# Patient Record
Sex: MALE | Race: WHITE | NOT HISPANIC OR LATINO | Employment: FULL TIME | URBAN - METROPOLITAN AREA
[De-identification: names, ages, dates, MRNs, and addresses within clinical notes are randomized per-mention and may not be internally consistent; named-entity substitution may affect disease eponyms.]

---

## 2024-05-17 ENCOUNTER — OFFICE VISIT (OUTPATIENT)
Dept: FAMILY MEDICINE | Facility: CLINIC | Age: 29
End: 2024-05-17

## 2024-05-17 VITALS
DIASTOLIC BLOOD PRESSURE: 78 MMHG | TEMPERATURE: 98 F | SYSTOLIC BLOOD PRESSURE: 110 MMHG | RESPIRATION RATE: 20 BRPM | BODY MASS INDEX: 21.76 KG/M2 | WEIGHT: 152 LBS | HEART RATE: 94 BPM | HEIGHT: 70 IN | OXYGEN SATURATION: 97 %

## 2024-05-17 DIAGNOSIS — Z71.89 ACP (ADVANCE CARE PLANNING): Primary | ICD-10-CM

## 2024-05-17 DIAGNOSIS — Z13.220 LIPID SCREENING: ICD-10-CM

## 2024-05-17 DIAGNOSIS — N34.2 URETHRITIS: ICD-10-CM

## 2024-05-17 DIAGNOSIS — Z76.89 HEALTH CARE HOME: ICD-10-CM

## 2024-05-17 DIAGNOSIS — Z13.1 DIABETES MELLITUS SCREENING: ICD-10-CM

## 2024-05-17 DIAGNOSIS — Z00.00 ROUTINE PHYSICAL EXAMINATION: ICD-10-CM

## 2024-05-17 PROCEDURE — 99212 OFFICE O/P EST SF 10 MIN: CPT | Mod: 25 | Performed by: STUDENT IN AN ORGANIZED HEALTH CARE EDUCATION/TRAINING PROGRAM

## 2024-05-17 PROCEDURE — 99385 PREV VISIT NEW AGE 18-39: CPT | Performed by: STUDENT IN AN ORGANIZED HEALTH CARE EDUCATION/TRAINING PROGRAM

## 2024-05-17 RX ORDER — DOXYCYCLINE HYCLATE 100 MG
100 TABLET ORAL 2 TIMES DAILY
Qty: 20 TABLET | Refills: 0 | Status: SHIPPED | OUTPATIENT
Start: 2024-05-17 | End: 2024-05-27

## 2024-05-17 RX ORDER — CETIRIZINE HYDROCHLORIDE 10 MG/1
10 TABLET ORAL DAILY
COMMUNITY

## 2024-05-17 NOTE — NURSING NOTE
Chief Complaint   Patient presents with    New Patient     New patient to this clinic     Physical     Annual, non fasting     Consult For     Jammed hand golfing, happened a month ago and still is having some pain, did have  look at it and was told there was nothing broken, does get shoulder pain      Pre-visit Screening:  Immunizations:  unknown-need to get records-moved here from [place]-did find records in care everywhere   Colonoscopy:  na  Mammogram: na  Asthma Action Test/Plan:  na  PHQ9:  PHQ2 done today   GAD7:  na  Questioned patient about current smoking habits Pt. has never smoked.  Ok to leave detailed message on voice mail for today's visit only yes, phone # [phone] (home) [phone] (work)

## 2024-05-17 NOTE — PATIENT INSTRUCTIONS
Start antibiotic twice daily, continue to drink plenty of fluids - need to repeat labs if not resolved    Call for fasting lab appointment anytime     Can consider Cardiology consult anytime given family history

## 2024-05-17 NOTE — PROGRESS NOTES
SUBJECTIVE:   CC: Justin Gale is an 28 year old male who presents for preventive health visit.     Patient has been advised of split billing requirements and indicates understanding: Yes    Nursing Notes:   Carissa Munoz CMA  5/17/2024 10:13 AM  Signed  Chief Complaint   Patient presents with    New Patient     New patient to this clinic     Physical     Annual, non fasting     Consult For     Jammed hand golfing, happened a month ago and still is having some pain, did have  look at it and was told there was nothing broken, does get shoulder pain      Pre-visit Screening:  Immunizations:  unknown-need to get records-moved here from [place]-did find records in care everywhere   Colonoscopy:  na  Mammogram: na  Asthma Action Test/Plan:  na  PHQ9:  PHQ2 done today   GAD7:  na  Questioned patient about current smoking habits Pt. has never smoked.  Ok to leave detailed message on voice mail for today's visit only yes, phone # [phone] (home) [phone] (work)         Healthy Habits:  General health: good, has made lifestyle changes since last cpe 2023 - eating better and exercising more regularly  Sleep: no concerns   Mental Health: no concerns      Problems taking medications regularly not applicable  Have you had an eye exam in the past two years? No concerns   Do you see a dentist twice per year? yes  Do you have sleep apnea, excessive snoring or daytime drowsiness?no      Today's PHQ-2 Score:       5/17/2024    10:05 AM   PHQ-2 ( 1999 [employer])   Q1: Little interest or pleasure in doing things 0   Q2: Feeling down, depressed or hopeless 0   PHQ-2 Score 0       Do you feel safe in your environment? Yes        Social History     Tobacco Use    Smoking status: Never    Smokeless tobacco: Never   Substance Use Topics    Alcohol use: Yes     Alcohol/week: 2.0 standard drinks of alcohol     Types: 2 Standard drinks or equivalent per week     Comment: 2 times a week     If you drink alcohol do  "you typically have >3 drinks per day or >7 drinks per week? No                      Last PSA: No results found for: \"PSA\"    Reviewed orders with patient. Reviewed health maintenance and updated orders accordingly - Yes  Lab work is in process  Labs reviewed in EPIC  BP Readings from Last 3 Encounters:   05/17/24 110/78    Wt Readings from Last 3 Encounters:   05/17/24 68.9 kg (152 lb)                    Reviewed and updated as needed this visit by clinical staff   Tobacco  Allergies  Meds              Reviewed and updated as needed this visit by Provider                  Past Medical History:   Diagnosis Date    No pertinent past medical history       Past Surgical History:   Procedure Laterality Date    BILATERAL HIP ARTHROSCOPY      FINGER SURGERY      L pinky pinned     Family History   Problem Relation Age of Onset    Hyperlipidemia Mother     Asthma Mother     Breast Cancer Mother     Osteoarthritis Father     Hyperlipidemia Father     Ulcerative Colitis Sister     Heart Failure Maternal Grandmother     Heart Disease Maternal Grandmother     Coronary Artery Disease Maternal Grandfather     Heart Disease Maternal Grandfather     Osteoarthritis Paternal Grandmother     Cerebrovascular Disease Paternal Grandmother     Cancer Paternal Grandfather         lung cancer, 87    Skin Cancer Paternal Grandfather     Diabetes No family hx of        ROS:  12 point ROS performed and negative for new concerns except as mentioned above     OBJECTIVE:   /78 (BP Location: Right arm, Patient Position: Sitting, Cuff Size: Adult Large)   Pulse 94   Temp 98  F (36.7  C) (Temporal)   Resp 20   Ht 1.765 m (5' 9.5\")   Wt 68.9 kg (152 lb)   SpO2 97%   BMI 22.12 kg/m    EXAM:  GENERAL: alert and no distress  EYES: Eyes grossly normal to inspection, PERRL and conjunctivae and sclerae normal  HENT: ear canals and TM's normal, nose and mouth without ulcers or lesions  NECK: no adenopathy, no asymmetry, masses, or " "scars  RESP: lungs clear to auscultation - no rales, rhonchi or wheezes  CV: regular rate and rhythm, normal S1 S2, no S3 or S4, no murmur, click or rub, no peripheral edema  ABDOMEN: soft, nontender, no hepatosplenomegaly, no masses and bowel sounds normal  MS: no gross musculoskeletal defects noted, no edema  SKIN: no suspicious lesions or rashes  NEURO: Normal strength and tone, mentation intact and speech normal  PSYCH: mentation appears normal, affect normal/bright    Diagnostic Test Results:  Labs reviewed in Epic    ASSESSMENT/PLAN:       ICD-10-CM    1. ACP (advance care planning)  Z71.89       2. Health Care Home  Z76.89       3. Routine physical examination  Z00.00       4. Lipid screening  Z13.220 Lipid Panel (BFP)      5. Diabetes mellitus screening  Z13.1 Comprehensive Metobolic Panel (BFP)      6. Urethritis  N34.2 doxycycline hyclate (VIBRA-TABS) 100 MG tablet           Patient has been advised of split billing requirements and indicates understanding: Yes  Other issues addressed:   L hand injury - ttp prox 2nd MT only without deformity or mechanism to suggest fracture, likely sprain. Recommend 4 weeks of rest, consider OTC brace. If not improving would favor MRI to eval for ligamentous injury.  Dysuria and urethral discharge - reports full negative panel STD and urinalysis at urgent care, records not available. Sx improved but didn't resolve after 7d abx (macrobid?). Will tx with course of doxy, needs retesting if sx don't resolve completely.     COUNSELING:  Reviewed preventive health counseling, as reflected in patient instructions       Regular exercise       Healthy diet/nutrition       Vision screening       Immunizations       Alcohol Use        Safe sex practices/STD prevention    Estimated body mass index is 22.12 kg/m  as calculated from the following:    Height as of this encounter: 1.765 m (5' 9.5\").    Weight as of this encounter: 68.9 kg (152 lb).        He reports that he has never " smoked. He has never used smokeless tobacco.    Patient Instructions   Start antibiotic twice daily, continue to drink plenty of fluids - need to repeat labs if not resolved    Call for fasting lab appointment anytime     Can consider Cardiology consult anytime given family history     Leif Perry MD, [facility]

## 2024-05-17 NOTE — PROGRESS NOTES
SUBJECTIVE:   CC: Justin Gale is an 28 year old male who presents for preventive health visit.     Patient has been advised of split billing requirements and indicates understanding: Yes    Nursing Notes:   Carissa Munoz CMA  5/17/2024 10:13 AM  Signed  Chief Complaint   Patient presents with    New Patient     New patient to this clinic     Physical     Annual, non fasting     Consult For     Jammed hand golfing, happened a month ago and still is having some pain, did have  look at it and was told there was nothing broken, does get shoulder pain      Pre-visit Screening:  Immunizations:  unknown-need to get records-moved here from [place]-did find records in care everywhere   Colonoscopy:  na  Mammogram: na  Asthma Action Test/Plan:  na  PHQ9:  PHQ2 done today   GAD7:  na  Questioned patient about current smoking habits Pt. has never smoked.  Ok to leave detailed message on voice mail for today's visit only yes, phone # [phone] (home) [phone] (work)         Healthy Habits:  General health: ***  Diet: ***  Exercise: ***  Sleep: ***  Mental Health: ***     Problems taking medications regularly { :754377}  Medication side effects: { :611165}  Have you had an eye exam in the past two years? { :505827}  Do you see a dentist twice per year? { :809581}  Do you have sleep apnea, excessive snoring or daytime drowsiness?{ :195027}    {additional problems to add (Optional):856346}    Today's PHQ-2 Score:       5/17/2024    10:05 AM   PHQ-2 ( 1999 [employer])   Q1: Little interest or pleasure in doing things 0   Q2: Feeling down, depressed or hopeless 0   PHQ-2 Score 0       Do you feel safe in your environment? {YES/NO/NA:710173}        Social History     Tobacco Use    Smoking status: Never    Smokeless tobacco: Never   Substance Use Topics    Alcohol use: Yes     Alcohol/week: 2.0 standard drinks of alcohol     Types: 2 Standard drinks or equivalent per week     Comment: 2 times a week  "    If you drink alcohol do you typically have >3 drinks per day or >7 drinks per week? {ETOH :081129}                      Last PSA: No results found for: \"PSA\"    Reviewed orders with patient. Reviewed health maintenance and updated orders accordingly - Yes  {Chronicprobdata (Optional):320255}    Reviewed and updated as needed this visit by clinical staff   Tobacco  Allergies  Meds              Reviewed and updated as needed this visit by Provider                  No past medical history on file.   No past surgical history on file.  No family history on file.    ROS:  12 point ROS performed and negative for new concerns except as mentioned above     OBJECTIVE:   /78 (BP Location: Right arm, Patient Position: Sitting, Cuff Size: Adult Large)   Pulse 94   Temp 98  F (36.7  C) (Temporal)   Resp 20   Ht 1.765 m (5' 9.5\")   Wt 68.9 kg (152 lb)   SpO2 97%   BMI 22.12 kg/m    EXAM:  {Exam Choices:573698}    {Diagnostic Test Results (Optional):833054}    ASSESSMENT/PLAN:       ICD-10-CM    1. ACP (advance care planning)  Z71.89       2. Health Care Home  Z76.89            Patient has been advised of split billing requirements and indicates understanding: Yes  COUNSELING:  {MALE COUNSELING MESSAGES:876455::\"Reviewed preventive health counseling, as reflected in patient instructions\"}    Estimated body mass index is 22.12 kg/m  as calculated from the following:    Height as of this encounter: 1.765 m (5' 9.5\").    Weight as of this encounter: 68.9 kg (152 lb).    {Weight Management Plan (ACO) Complete if BMI is abnormal-  Ages 18-64  BMI >24.9.  Age 65+ with BMI <23 or >30 (Optional):947058}    He reports that he has never smoked. He has never used smokeless tobacco.      Counseling Resources:  ATP IV Guidelines  Pooled Cohorts Equation Calculator  FRAX Risk Assessment  ICSI Preventive Guidelines  Dietary Guidelines for Americans, 2010  USDA's MyPlate  ASA Prophylaxis  Lung CA Screening    There are no " Patient Instructions on file for this visit.     Leif Perry MD, CAQSM  [facility]

## 2024-05-29 DIAGNOSIS — Z13.220 LIPID SCREENING: ICD-10-CM

## 2024-05-29 DIAGNOSIS — Z13.1 DIABETES MELLITUS SCREENING: ICD-10-CM

## 2024-05-29 LAB
ALBUMIN SERPL-MCNC: 4.4 G/DL (ref 3.6–5.1)
ALP SERPL-CCNC: 56 U/L (ref 33–130)
ALT 1742-6: 8 U/L (ref 0–32)
AST 1920-8: 13 U/L (ref 0–35)
BILIRUB SERPL-MCNC: 0.6 MG/DL (ref 0.2–1.2)
BUN SERPL-MCNC: 15 MG/DL (ref 7–25)
BUN/CREATININE RATIO: 15 (ref 6–32)
CALCIUM SERPL-MCNC: 9.6 MG/DL (ref 8.6–10.3)
CHLORIDE SERPLBLD-SCNC: 108.8 MMOL/L (ref 98–110)
CHOLEST SERPL-MCNC: 169 MG/DL (ref 0–199)
CHOLEST/HDLC SERPL: 4 {RATIO} (ref 0–5)
CO2 SERPL-SCNC: 28.8 MMOL/L (ref 20–32)
CREAT SERPL-MCNC: 0.98 MG/DL (ref 0.6–1.3)
GLUCOSE SERPL-MCNC: 84 MG/DL (ref 60–99)
HDLC SERPL-MCNC: 45 MG/DL (ref 40–150)
LDLC SERPL CALC-MCNC: 107 MG/DL (ref 0–130)
POTASSIUM SERPL-SCNC: 4.33 MMOL/L (ref 3.5–5.3)
PROT SERPL-MCNC: 6.9 G/DL (ref 6.1–8.1)
SODIUM SERPL-SCNC: 143.2 MMOL/L (ref 135–146)
TRIGL SERPL-MCNC: 84 MG/DL (ref 0–149)

## 2024-05-29 PROCEDURE — 80053 COMPREHEN METABOLIC PANEL: CPT | Performed by: STUDENT IN AN ORGANIZED HEALTH CARE EDUCATION/TRAINING PROGRAM

## 2024-05-29 PROCEDURE — 36415 COLL VENOUS BLD VENIPUNCTURE: CPT | Performed by: STUDENT IN AN ORGANIZED HEALTH CARE EDUCATION/TRAINING PROGRAM

## 2024-05-29 PROCEDURE — 80061 LIPID PANEL: CPT | Performed by: STUDENT IN AN ORGANIZED HEALTH CARE EDUCATION/TRAINING PROGRAM

## 2024-05-29 NOTE — LETTER
May 29, 2024      Justin Gale  [address]  [facility] [address]        Dear ,    Labs look great. Let me know if you have any questions. -Dr. Perry     Resulted Orders   Comprehensive Metobolic Panel (BFP)   Result Value Ref Range    Carbon Dioxide 28.8 20 - 32 mmol/L    Creatinine 0.98 0.60 - 1.30 mg/dL    Glucose 84 60 - 99 mg/dL    Sodium 143.2 135 - 146 mmol/L    Potassium 4.33 3.5 - 5.3 mmol/L    Chloride 108.8 98 - 110 mmol/L    Protein Total 6.9 6.1 - 8.1 g/dL    Albumin 4.4 3.6 - 5.1 g/dL    Alkaline Phosphatase 56 33 - 130 U/L    ALT 8 0 - 32 U/L    AST 13 0 - 35 U/L    Bilirubin Total 0.6 0.2 - 1.2 mg/dL    Urea Nitrogen 15 7 - 25 mg/dL    Calcium 9.6 8.6 - 10.3 mg/dL    BUN/Creatinine Ratio 15 6 - 32   Lipid Panel (BFP)   Result Value Ref Range    Cholesterol 169 0 - 199 mg/dL    Triglycerides 84 0 - 149 mg/dL    HDL Cholesterol 45 40 - 150 mg/dL    LDL Cholesterol Direct 107 0 - 130 mg/dL    Cholesterol/HDL Ratio 4 0 - 5

## 2024-06-17 PROBLEM — Z76.89 HEALTH CARE HOME: Status: RESOLVED | Noted: 2024-05-17 | Resolved: 2024-06-17
